# Patient Record
Sex: MALE | Race: BLACK OR AFRICAN AMERICAN
[De-identification: names, ages, dates, MRNs, and addresses within clinical notes are randomized per-mention and may not be internally consistent; named-entity substitution may affect disease eponyms.]

---

## 2017-02-13 ENCOUNTER — HOSPITAL ENCOUNTER (EMERGENCY)
Age: 27
Discharge: HOME | End: 2017-02-13
Payer: COMMERCIAL

## 2017-02-13 DIAGNOSIS — S01.511A: ICD-10-CM
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
## 2017-02-13 DIAGNOSIS — S02.40CA: ICD-10-CM

## 2017-02-13 DIAGNOSIS — S06.0X1A: Primary | ICD-10-CM
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
## 2017-02-13 PROCEDURE — 99283 EMERGENCY DEPT VISIT LOW MDM: CPT

## 2017-02-13 PROCEDURE — 12011 RPR F/E/E/N/L/M 2.5 CM/<: CPT

## 2017-02-13 PROCEDURE — 70486 CT MAXILLOFACIAL W/O DYE: CPT

## 2017-02-13 PROCEDURE — 99284 EMERGENCY DEPT VISIT MOD MDM: CPT

## 2017-02-13 PROCEDURE — 70450 CT HEAD/BRAIN W/O DYE: CPT

## 2021-11-22 ENCOUNTER — HOSPITAL ENCOUNTER (OUTPATIENT)
Dept: HOSPITAL 76 - SC | Age: 31
Discharge: HOME | End: 2021-11-22
Attending: INTERNAL MEDICINE
Payer: COMMERCIAL

## 2021-11-22 VITALS — DIASTOLIC BLOOD PRESSURE: 79 MMHG | SYSTOLIC BLOOD PRESSURE: 114 MMHG

## 2021-11-22 DIAGNOSIS — G47.8: ICD-10-CM

## 2021-11-22 DIAGNOSIS — G47.10: ICD-10-CM

## 2021-11-22 DIAGNOSIS — E66.9: ICD-10-CM

## 2021-11-22 DIAGNOSIS — R06.83: Primary | ICD-10-CM

## 2021-11-22 PROCEDURE — 99212 OFFICE O/P EST SF 10 MIN: CPT

## 2021-11-22 PROCEDURE — 99202 OFFICE O/P NEW SF 15 MIN: CPT

## 2021-11-22 NOTE — SLEEP CARE CONSULTATION
Information from patient questionnaire entered by Ayah Babb MA.





I have reviewed and concur with the information entered by Ayah Babb MA. 

This document represents the service I personally performed and the decisions 

made by me, Ivette Gallagher MD, [facility].





History of Present Illness


Service Date and Time: 11/22/2021    1332


Reason for Visit: New patient


Chief Complaint: reports: Unrefreshed sleep, Snoring, Observed pauses in 

breathing, Fatigue, Frequent awakenings at night


Date of Onset: 1 - 3 years


Usual bedtime: bedtime changes with my work


Time it takes to fall asleep: about an hour


Snores at night: Yes


Observed to quit breathing while asleep: Yes


Sleeps alone due to snoring: No


Reasons for waking at night: reports: Snoring, Other (unknown reason)


Toss, Turn, or Twitch while sleeping: Yes


Recalls having dreams: No


Feels refreshed in the morning: No


Morning headache: Yes


Sleepy or fatigued during the day: Yes


Ever fallen asleep while driving: Yes


Takes day naps: No


Dreams during day naps: No


Prior sleep studies: No


Additional HPI information: 


I had the pleasure of seeing Mr. Santos today regarding the possibility of him 

having a sleep disorder.  As you know, he is a 31 year old gentleman who 

complains of frequent awakenings, persistent fatigue, unrefreshed sleep, loud 

snore, and observed apneas.  The patient works shift and has no regular sleep-

wake schedule.  It does not take him long to fall asleep.  He has been told that

he snores loudly and irregularly at night.  He has also been observed to stop 

breathing in his sleep.  His wife can still sleep in the same bed.  He can 

recall waking up on the average of 4 times during the night.  Most of the time 

he wakes up because of no apparent reason.  He has never awakened because of his

own snoring, choking, or having to gasp for air.  There is a lot of tossing and 

turning in his sleep.  He has somniloquy (sleep talking) and somnambulism (sleep

walking).  Generally, he can recall having dreams.  He does not feel refreshed 

or rested when getting up in the morning.  He usually does not have a morning 

headache.  During the day he complains of feeling sleepy and fatigued.  His 

score on [place] Sleepiness Scale is 17 out of 24.  He has fallen asleep while 

driving and has gone out of the jovon.  He usually does not take naps during the 

day.  He reports having impaired concentration during the day.








Subjective


Initial [place] Sleepiness Scale score: 17 (in 2021)





Social History


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 Patient is  and lives in [place]. 





Have you smoked in the past 12 months: No


Alcohol use: Yes


Alcohol amount and frequency: 3 x a month


Caffeine use: Yes


Caffeine amount and frequency: 2 x a week





Allergies and Home Medications


Known drug allergies: Yes (Naproxin, Flexeril)


Drug allergies reviewed: Yes


Home medication list reviewed: Yes





Review of Systems


Weight gain over past 5 years: 50 plus


Cardiovascular: denies: high blood pressure, palpitations, chest pain, irregular

heart rate or pulse, leg or foot swelling, have to sleep sitting up, other


Respiratory: denies: shortness of breath, wheeze, sputum production, chronic 

cough, other


Gastrointestinal: denies: heartburn, difficulty swallowing, nausea, vomitting, 

diarrhea, abdominal pain, other


Urinary: denies: incontinence, frequency, urgency, impotence, other


Neurological: denies: headaches, seizure, head trauma, disorientation, speech 

dysfunction, gait or balance problems, fainting or unconsciousness, other


Psychiatric: denies: Attention Deficit Hyperactivity, anxiety, depression, mood 

disorder, claustrophobia, other


Ear/Nose/Throat: denies: nasal congestion, sinus problems, nose bleeds, dry 

mouth/throat, hoarseness, injury to nose, tonsillectomy, wisdom teeth removed, 

other


Endocrine: denies: thyroid disease, history of goiter, sluggishness, too hot or 

cold, excessive thirst, increased appetite, increased urination, unexplained 

weakness, other


Musculoskeletal: reports: back pain


Immunologic: reports: other (Eczema)





Physical Exam


Vital signs obtained and entered by: PIRMO Wiggins


Blood Pressure: 114/79 (left)


Cuff size: wrist


Heart Rate: 93


O2 Saturation: 98 (with mask)


Height: 5 ft 4 in


Weight: 231 lb (with boots)


Body Mass Index: 39.6


BMI Classification: Obese





Impression and Plan


IMPRESSION: 1. Obstructive Sleep Apnea-Hypopnea Syndrome, as suggested by 

history of loud and irregular snoring, observed cessation of breath while 

asleep, frequent awakenings during the night, unrefreshed sleep, and daytime 

hypersomnolence.  Narrow oropharynx and obesity are common predisposing factors 

for obstructive sleep apnea-hypopnea syndrome.  I recommend proceeding to 

polysomnography to confirm the diagnosis and to assess severity.  If he has 

significant sleep disordered breathing, a manual CPAP titration study will also 

be performed to find the optimal treatment pressure.  I informed the patient of 

what the sleep studies involve and after some discussion, he agreed to proceed. 





Plan:  1. Schedule polysomnography + manual CPAP titration study and return in 1

to 2 weeks after the study to discuss result and initiate therapy.


   2. Avoid long distance driving or when feeling sleepy.


   3. Avoid alcohol, sedative and muscle relaxant around bedtime.


   4. Attempt to lose weight.





Follow up with Sleep Care in: 1-2 months


Visit Type: In Office


Time Spent with Patient (minutes): 15


Provider Statement: I spent 100% of the Face to Face Visit with the patient with

greater than 50% spent counseling the patient and coordination of care.

## 2022-01-04 ENCOUNTER — HOSPITAL ENCOUNTER (OUTPATIENT)
Dept: HOSPITAL 76 - SC | Age: 32
Discharge: HOME | End: 2022-01-04
Attending: INTERNAL MEDICINE
Payer: COMMERCIAL

## 2022-01-04 DIAGNOSIS — G47.33: Primary | ICD-10-CM

## 2022-01-04 PROCEDURE — 95810 POLYSOM 6/> YRS 4/> PARAM: CPT

## 2022-01-24 ENCOUNTER — HOSPITAL ENCOUNTER (OUTPATIENT)
Dept: HOSPITAL 76 - SC | Age: 32
Discharge: HOME | End: 2022-01-24
Attending: INTERNAL MEDICINE
Payer: COMMERCIAL

## 2022-01-24 VITALS — SYSTOLIC BLOOD PRESSURE: 125 MMHG | DIASTOLIC BLOOD PRESSURE: 84 MMHG

## 2022-01-24 DIAGNOSIS — G47.33: Primary | ICD-10-CM

## 2022-01-24 DIAGNOSIS — E66.9: ICD-10-CM

## 2022-01-24 PROCEDURE — 99212 OFFICE O/P EST SF 10 MIN: CPT

## 2022-01-24 NOTE — SLEEP CARE CONSULTATION
Information from patient questionnaire entered by Ayah Babb MA.





I have reviewed and concur with the information entered by Ayah Babb MA. 

This document represents the service I personally performed and the decisions 

made by me, Ivette Gallagher MD, [facility].





History of Present Illness


Service Date and Time: 01/24/2022    0924


Initial [place] Sleepiness Scale score: 17 (in 2021)


Current [place] Sleepiness Scale score: 13 (2022)


Additional HPI information: 


HPI:  Mr. Santos returned for follow up of the sleep study he had on 1/4/2022. 

The polysomnography showed that the patient had normal sleep efficiency. The 

sleep architecture was abnormal for sleep fragmentation and reduced amount of 

time spent in slow wave sleep (N3). Respiratory monitoring showed moderate 

obstructive sleep apnea-hypopnea (AHI = 27.8) associated with frequent arousals,

oxyhemoglobin desaturation and moderate hypoxia (delfin oxygen saturation of 69

%). Baseline oxygen saturation was normal. The respiratory events occurred more 

frequently during supine sleep (supine AHI = 38.9; non-supine = 15.91). Snore 

was light to loud in intensity. There was no significant periodic leg movement 

of sleep. Cardiac rhythm was normal sinus rhythm without significant arrhythmia.

No abnormal behavior (parasomnia) observed during the night.





The patient was informed of these findings.  I explained to him the patho

physiology behind obstructive sleep apnea.  We then spent quite a bit of time 

discussing different treatment options.  For mild obstructive sleep apnea, 

surgery and oral appliance are alternatives to nasal CPAP therapy but in 

moderate or severe cases, nasal CPAP is the most effective and reliable 

treatment.  Weight loss in an obese individual is strongly recommended.  After 

some discussion, he opted to go with the nasal CPAP therapy.  I explained to him

how CPAP machine works and what to expect when using the machine.  He is 

encouraged to use CPAP every night especially in the first 2 to 3 nights in 

order to get used to it.  He should call his CPAP supplier or me to discuss any 

mechanical problem that may occur.  If he snores or feels like he is not getting

enough air from the machine, he should notify me and I will increase the 

pressure.








Sleep Study





- Results


Prior sleep studies: No





Allergies and Home Medications


Known drug allergies: Yes


Drug allergies reviewed: Yes


Home medication list reviewed: No





Review of Systems


Review of systems same as previous: Yes





Physical Exam


Vital signs obtained and entered by: RAHEEL BABB CMA AAMA


Blood Pressure: 125/84 (LEFT, PULSE 80)


Cuff size: wrist


Heart Rate: 63


O2 Saturation: 96 (PAPER MASK)


Height: 5 ft 4 in


Weight: 230 lb (CLOTHES)


Body Mass Index: 39.4


BMI Classification: Obese





Impression and Plan


IMPRESSION: 1. Obstructive Sleep Apnea-Hypopnea Syndrome, moderate, associated 

with moderate hypoxemia and sleep fragmentation.  Most likely this is the cause 

of the patients symptoms of unrefreshed sleep, and excessive daytime 

sleepiness. As mentioned above, the patient will be started on an autoCPAP set 

between 5 and 15 cmH2O.  Depending on his response and compliance he may be 

brought back for an overnight CPAP titration study.





PLAN:     1.   Prescription made for an autoCPAP, heated humidifier, and related

supplies. 


2.   Attempt to lose weight and avoid alcohol consumption near bedtime.


3.   The patient is again cautioned about driving until his sleepiness 

completely resolves on the CPAP therapy.


4.   Return for follow up after one month of using the CPAP.





Counseling Topics: Weight loss health impact


Prescriptions: Auto CPAP


Follow up with Sleep Care in: 1-2 months


Visit Type: In Office


Time Spent with Patient (minutes): 15


Provider Statement: I spent 100% of the Face to Face Visit with the patient with

greater than 50% spent counseling the patient and coordination of care.

## 2022-07-19 ENCOUNTER — HOSPITAL ENCOUNTER (OUTPATIENT)
Dept: HOSPITAL 76 - SC | Age: 32
Discharge: HOME | End: 2022-07-19
Attending: NURSE PRACTITIONER
Payer: COMMERCIAL

## 2022-07-19 VITALS — SYSTOLIC BLOOD PRESSURE: 137 MMHG | DIASTOLIC BLOOD PRESSURE: 81 MMHG

## 2022-07-19 DIAGNOSIS — G47.33: Primary | ICD-10-CM

## 2022-07-19 DIAGNOSIS — E66.9: ICD-10-CM

## 2022-07-19 PROCEDURE — 99212 OFFICE O/P EST SF 10 MIN: CPT

## 2022-07-19 NOTE — SLEEP CARE CONSULTATION
Information from patient questionnaire entered by Ayah Enrique MA.





I have reviewed and concur with the information entered by Ayah Enrique MA. 

This document represents the service I personally performed and the decisions 

made by me, Janay Rangel ARNP.





History of Present Illness


Service Date and Time: 07/19/2022    1049


Previous diagnosis: Moderate, Obstructive Sleep Apnea-Hypopnea Syndrome


AHI: 27.8 (in 2022)


Reason for follow up: first compliance (RAMIREZ 2/2022, )


Equipment type: CPAP


Equipment obtained from: Other ([facility]; got initial supplies)


Mask style: Full face


Mask brand: Resmed (Airfit F20, medium)


Backup mask available: No (will keep old mask when replaced)


Last cushion change: 2 weeks


Prior sleep studies: No


HPI additional information: 





GUERLINE CHUNG was diagnosed to have moderate, AHI 27.8, obstructive sleep 

apnea-hypopnea syndrome and returned today for CPAP therapy first compliance 

follow-up.





Sleep Study





- Results


Prior sleep studies: No





CPAP Compliance Data





- Data Reviewed with Patient


Average duration of nightly device use: 5 HOURS 18 MINUTES


Compliance rate %: 73 (2/18/22-7/17/22; 132/150 days)


Current pressure setting (cmH2O): 5-15 (median 9.7, avg 13.0, max 13.9)


Average residual AHI: 2.1


Central apnea: .0


Obstructive apnea: 1.4


Hypopnea: .4


Average large leak: 1.3





Subjective


Patient concerns: reports: mask discomfort (change to the Airfit with more 

comfort now), air blowing in eyes, condensation in mask/hose (with humidity up 

and heated hose; he), dry mouth, nose, throat (dry nose), epistaxis (little bit 

of blood when blowing nose in morning).  denies: aerophagia, mask leak noise, 

nasal congestion


Observed to snore while using device: Yes (sometimes)


Current pressure setting perceived as: comfortable


On therapy, patient: reports: sleeping better (sometimes), awakening more 

refreshed (most days), being more awake and alert during the day, more rested 

overall.  denies: drowsiness while driving


Initial [place] Sleepiness Scale score: 17 (in 2021)


Current [place] Sleepiness Scale score: 10 (7/19/2022)





Allergies and Home Medications


Home medication list reviewed: Yes (no changes)


Allergy and home medication list: 


Allergies





No Known Drug Allergies Allergy (Verified 02/13/17 18:30)


   





Review of Systems


Review of systems same as previous: Yes (no changes)





Physical Exam


Vital signs obtained and entered by: RAHEEL ENRIQUE CMA AAMW


Blood Pressure: 137/81 (PULSE 66, RESP 18, LEFT)


Cuff size: wrist


Heart Rate: 66


O2 Saturation: 98


Height: 5 ft 4 in


Weight: 225 lb (CLOTHES)


Weight change since last visit: LOSE - GYM 


Body Mass Index: 38.6


BMI Classification: Obese





Impression and Plan





1. Obstructive Sleep Apnea-Hypopnea Syndrome, moderate, with good treatment 

compliance and good apnea control. On CPAP therapy, the patient has better sleep

quality and is more rested overall. Patient is happy with current CPAP settings.

 He states he is getting used to it.  He has had some issues with dry nose and 

mouth.  He tried to increase his humidity but then he got condensation in the 

tubing.  [place] that he did not want to

use the heated hose.  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  

He has since made adjustments since he came home and these issues are almost 

completely resolved. The patients pressure will be changed to autoCPAP 10-14 

cmH20 to reflect pressure being used. Patient advised to contact me if pressure 

change is uncomfortable so that it can be adjusted. Goals for apnea control 

discussed. Patient's apnea severity and rationale for treatment to reduce apnea,

improve sleep quality and reduce cardiovascular and cerebrovascular events was 

reviewed. Normally I would have patient come in for a follow-up after a pressure

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  I instructed him to contact us here for an appointment if he is having 

any issues that he needs resolved before leaving the area.  He voiced 

understanding agreement with this plan of care.





2. Obesity, unspecified. Currently patients BMI is 38.6.  Obesity increases the

risk of apnea, CPAP pressure requirements and overall health risks especially 

cardiovascular and diabetes. Thus patient is advised to lose weight. Weight loss

can be done with reducing portion size, reducing refined foods and balancing 

content with vegetables, fruit and whole grain foods. In addition, patient 

encouraged to get regular exercise.








* Change auto CPAP pressure to 10-14 cmH2O


* Notify me if snoring with mask or feeling that the pressure is too much or too

  little


* Attempt to lose weight


* Call this office if any problems using CPAP


* Return for follow up in 1 year, or sooner if concerns arise





Counseling Topics: Spare mask, Weight loss health impact


Visit Type: In Office


Time Spent with Patient (minutes): 18


Provider Statement: I spent 100% of the Face to Face Visit with the patient with

greater than 50% spent counseling the patient and coordination of care.